# Patient Record
Sex: FEMALE | Race: WHITE
[De-identification: names, ages, dates, MRNs, and addresses within clinical notes are randomized per-mention and may not be internally consistent; named-entity substitution may affect disease eponyms.]

---

## 2017-06-14 ENCOUNTER — HOSPITAL ENCOUNTER (EMERGENCY)
Dept: HOSPITAL 45 - C.EDB | Age: 27
Discharge: HOME | End: 2017-06-14
Payer: COMMERCIAL

## 2017-06-14 VITALS
HEIGHT: 62.01 IN | BODY MASS INDEX: 53.24 KG/M2 | HEIGHT: 62.01 IN | BODY MASS INDEX: 53.24 KG/M2 | WEIGHT: 292.99 LBS | WEIGHT: 292.99 LBS

## 2017-06-14 VITALS — HEART RATE: 73 BPM | OXYGEN SATURATION: 97 % | DIASTOLIC BLOOD PRESSURE: 90 MMHG | SYSTOLIC BLOOD PRESSURE: 149 MMHG

## 2017-06-14 VITALS — TEMPERATURE: 98.06 F

## 2017-06-14 DIAGNOSIS — Z83.3: ICD-10-CM

## 2017-06-14 DIAGNOSIS — Z82.49: ICD-10-CM

## 2017-06-14 DIAGNOSIS — M25.522: Primary | ICD-10-CM

## 2017-06-14 DIAGNOSIS — N83.209: ICD-10-CM

## 2017-06-14 DIAGNOSIS — Z79.899: ICD-10-CM

## 2017-06-14 DIAGNOSIS — K21.9: ICD-10-CM

## 2017-06-14 NOTE — EMERGENCY ROOM VISIT NOTE
History


First contact with patient:  20:49


Chief Complaint:  ELBOW PAIN/INJURY


Stated Complaint:  LT ELBOW/ARM SWOLLEN- CAN BARLEY MOVE IT





History of Present Illness


The patient is a 26 year old female who presents to the Emergency Room via 

private vehicle with complaints of "left elbow/arm swollen can barely move in".

  The patient states that approximately one week ago her left elbow began to 

develop pain.  She denies or is unable to identify any injury to the area.  She 

states that the elbow pain is worse with movement.  She notes particularly 

worse with complete flexion of the left elbow.  She points to the musculature 

just inferior to the left elbow joint on the ventral aspect of the proximal 

forearm as a location of pain.  She also felt that the skin overlying the 

olecranon process was slightly red yesterday.  She denies any history of blood 

clots.  She denies any fevers or chills.  She notes that the joint has not been 

warm to the touch, or diffusely red.





Review of Systems


A complete 6-point Review of Systems was discussed with the patient, with 

pertinent positives and negatives listed in the History of Present Illness. All 

remaining Review of Systems questions can be considered negative unless 

otherwise specified.





Past Medical/Surgical History


Medical Problems:


(1) GERD (gastroesophageal reflux disease)


(2) Ovarian cyst








Family History





Diabetes mellitus


Heart disease





Social History


Smoking Status:  Never Smoker


Alcohol Use:  none


Marital Status:  in relationship


Housing Status:  lives with significant other


Occupation Status:  employed





Current/Historical Medications


Scheduled


Birth Control Pills (Birth Control Pills), 1 TAB PO DAILY


Escitalopram Oxalate (Escitalopram Oxalate), 20 MG PO DAILY


Pantoprazole (Pantoprazole Sodium), 40 MG PO DAILY





Allergies


Coded Allergies:  


     No Known Allergies (Unverified , 1/7/16)





Physical Exam


Vital Signs











  Date Time  Temp Pulse Resp B/P (MAP) Pulse Ox O2 Delivery O2 Flow Rate FiO2


 


6/14/17 22:40  73 20 149/90 97   


 


6/14/17 20:28 36.7 88 18  98 Room Air  











Physical Exam


VITAL SIGNS - Vital signs and nursing notes were reviewed.  Patient is afebrile

, hypertensive, non-tachycardic and is saturating well on room air.


GENERAL -26-year-old female appearing her stated age who is in no acute 

distress. Communicates well with provider and answers questions appropriately.


SKIN - Without rashes.  Skin overlying the left elbow is unremarkable.  There 

is specifically no erythema, edema or rash.


EXTREMITIES - No clubbing or peripheral cyanosis. No pretibial edema present.  

No tenderness to palpation overlying the left shoulder and left humerus.  There 

is no tenderness to palpation overlying the left elbow joint.  With passive 

range of motion there is reproducible tenderness with complete flexion.  No 

palpable bursitis or inflammation overlying the olecranon process.  She is 

neurovascularly intact in this region.  +5/5 strength noted in UE/LE 

bilaterally.





Medical Decision & Procedures


ER Provider


Diagnostic Interpretation:


LEFT ELBOW MIN 3 VIEWS ROUTINE





CLINICAL HISTORY: Left elbow pain, no trauma    





COMPARISON: None.





DISCUSSION: The bones and joint spaces appear intact. There is no evidence of


fracture, dislocation or bony disease. There is no evidence for soft tissue


swelling.





IMPRESSION: Negative study.











Electronically signed by:  Mk Gonsalves M.D.


6/14/2017 9:39 PM





Dictated Date/Time:  6/14/2017 9:39 PM





VENOUS DOPPLER LEFT ARM UPPER EXTREMITY VENOUS DOPPLER





HISTORY: Pain  Left elbow region edema, tenderness. No trauma





COMPARISON STUDY:  None.





FINDINGS: The internal jugular vein is patent. There is normal flow within the


subclavian vein. There is normal flow and compressibility within the left


axillary, basilic, brachial, radial, ulnar, and visualized cephalic veins. 





IMPRESSION:  


No DVT within the upper extremity. 











Electronically signed by:  Mk Gonsalves M.D.


6/14/2017 10:12 PM





Dictated Date/Time:  6/14/2017 10:12 PM





Medical Decision


Patient was seen and evaluated as above.  After obtaining a thorough history 

and physical examination, radiograph and x-ray were obtained of the affected 

regions.  The patient presents with an atraumatic left elbow pain.  I would 

like to state the elbow pain is rather in the musculature just inferior to left 

elbow joint.  There is no evidence of septic joint on examination.  The joint 

is not red, hot, swollen or tender to palpation.  Her vital signs are stable.  

She subjectively reports no fever.  Objectively no fever.  Radiograph is 

negative.  Ultrasound is negative.  I suspect the patient may be expressing 

either pain secondary to overuse as she does a lot of typing, or she may strain 

this region.  She also may have a slight tendinitis.  Regardless, I'll treat 

her conservatively with an arm sling, over-the-counter medication as well as 

follow-up with the on-call orthopedic doctor regarding today's visit by calling 

him tomorrow for which she was instructed to do.  She does seem happy with this 

plan of care.  She was educated upon worrisome symptoms which to return, as 

well as symptoms to watch out for in case she would develop a septic joint.  

She verbalized understanding.  She had questions regarding discharge, and was 

discharged home in good condition.





Evaluation treatment this patient the following differential diagnoses 

entertained: Fracture, Lyme disease, septic joint, bursitis, epicondylitis, 

tendonitis, muscle strain, among others.





Impression





 Primary Impression:  


 Elbow pain, left





Departure Information


Dispostion


Home / Self-Care





Condition


GOOD





Referrals


No Doctor, Assigned (PCP)





Patient Instructions


My [facility]





Additional Instructions





You have been treated in the Emergency Department for Elbow Pain. You have 

received pain medicine in the emergency department which impairs your ability 

to operate a vehicle. It is illegal for you to drive after receiving these 

medicines. 





For pain control, you can use the following over-the-counter medicines


:


- Regular strength (325mg/tab) Tylenol (acetaminophen) 2 tabs every 4-6 hours 

as needed. Do not exceed 12 tablets in a 24 hour period. Avoid taking more than 

3 grams (3000 mg) of Tylenol per day. This includes any other sources of 

acetaminophen you may take on a regular basis.





- Regular strength (200 mg/tab) Advil (ibuprofen) 1-2 tabs every 4-6 hours as 

needed. Do not exceed a dose of 3200 mg per day.





If this is a recent injury (<24 hrs), ice can be applied to the area of pain 

for the first 3 days to help decrease pain and inflammation.





You have been provided the number for an Orthopaedic Surgeon. You should call 

this number as soon as possible to establish a follow-up visit from today's 

Emergency Department visit.





Keep the sling/splint in place until evaluated by Orthopedics.





Return to the Emergency Department if your current symptoms worsen despite 

treatment course outlined above, or if you develop any of the following symptoms

: intractable pain despite aforementioned treatment course or new onset of 

numbness or tingling of the arm.








As we discussed if you develop a fever, red, hot, swollen elbow please return 

immediately.





Please return to the emergency department with any new/concerning symptoms.

## 2017-06-14 NOTE — DIAGNOSTIC IMAGING REPORT
LEFT ELBOW MIN 3 VIEWS ROUTINE



CLINICAL HISTORY: Left elbow pain, no trauma    



COMPARISON: None.



DISCUSSION: The bones and joint spaces appear intact. There is no evidence of

fracture, dislocation or bony disease. There is no evidence for soft tissue

swelling.



IMPRESSION: Negative study.







Electronically signed by:  Mk Gonsalves M.D.

6/14/2017 9:39 PM



Dictated Date/Time:  6/14/2017 9:39 PM

## 2017-06-14 NOTE — DIAGNOSTIC IMAGING REPORT
VENOUS DOPPLER LEFT ARM UPPER EXTREMITY VENOUS DOPPLER



HISTORY: Pain  Left elbow region edema, tenderness. No trauma



COMPARISON STUDY:  None.



FINDINGS: The internal jugular vein is patent. There is normal flow within the

subclavian vein. There is normal flow and compressibility within the left

axillary, basilic, brachial, radial, ulnar, and visualized cephalic veins. 



IMPRESSION:  

No DVT within the upper extremity. 







Electronically signed by:  Mk Gonsalves M.D.

6/14/2017 10:12 PM



Dictated Date/Time:  6/14/2017 10:12 PM

## 2017-11-22 ENCOUNTER — HOSPITAL ENCOUNTER (EMERGENCY)
Dept: HOSPITAL 45 - C.EDB | Age: 27
LOS: 1 days | Discharge: HOME | End: 2017-11-23
Payer: COMMERCIAL

## 2017-11-22 VITALS — TEMPERATURE: 97.88 F

## 2017-11-22 VITALS — SYSTOLIC BLOOD PRESSURE: 148 MMHG | OXYGEN SATURATION: 100 % | DIASTOLIC BLOOD PRESSURE: 78 MMHG | HEART RATE: 84 BPM

## 2017-11-22 VITALS
WEIGHT: 293 LBS | WEIGHT: 293 LBS | HEIGHT: 60.98 IN | BODY MASS INDEX: 55.32 KG/M2 | HEIGHT: 60.98 IN | BODY MASS INDEX: 55.32 KG/M2

## 2017-11-22 DIAGNOSIS — Z82.49: ICD-10-CM

## 2017-11-22 DIAGNOSIS — D72.829: Primary | ICD-10-CM

## 2017-11-22 DIAGNOSIS — R10.9: ICD-10-CM

## 2017-11-22 DIAGNOSIS — Z83.3: ICD-10-CM

## 2017-11-22 LAB
ANION GAP SERPL CALC-SCNC: 5 MMOL/L (ref 3–11)
BASOPHILS # BLD: 0.02 K/UL (ref 0–0.2)
BASOPHILS NFR BLD: 0.1 %
BUN SERPL-MCNC: 12 MG/DL (ref 7–18)
BUN/CREAT SERPL: 11.8 (ref 10–20)
CALCIUM SERPL-MCNC: 9 MG/DL (ref 8.5–10.1)
CHLORIDE SERPL-SCNC: 103 MMOL/L (ref 98–107)
CO2 SERPL-SCNC: 29 MMOL/L (ref 21–32)
COMPLETE: YES
CREAT CL PREDICTED SERPL C-G-VRATE: 111.5 ML/MIN
CREAT SERPL-MCNC: 1.02 MG/DL (ref 0.6–1.2)
EOSINOPHIL NFR BLD AUTO: 347 K/UL (ref 130–400)
GLUCOSE SERPL-MCNC: 90 MG/DL (ref 70–99)
HCT VFR BLD CALC: 36.8 % (ref 37–47)
IG%: 0.3 %
IMM GRANULOCYTES NFR BLD AUTO: 25.6 %
LYMPHOCYTES # BLD: 3.88 K/UL (ref 1.2–3.4)
MCH RBC QN AUTO: 27.8 PG (ref 25–34)
MCHC RBC AUTO-ENTMCNC: 33.2 G/DL (ref 32–36)
MCV RBC AUTO: 83.8 FL (ref 80–100)
MONOCYTES NFR BLD: 5.1 %
NEUTROPHILS # BLD AUTO: 1.3 %
NEUTROPHILS NFR BLD AUTO: 67.6 %
PMV BLD AUTO: 9.5 FL (ref 7.4–10.4)
POTASSIUM SERPL-SCNC: 3.7 MMOL/L (ref 3.5–5.1)
PREG INTERNAL NEGATIVE QC: (no result)
PREG INTERNAL POSITIVE QC: (no result)
RBC # BLD AUTO: 4.39 M/UL (ref 4.2–5.4)
SODIUM SERPL-SCNC: 138 MMOL/L (ref 136–145)
WBC # BLD AUTO: 15.18 K/UL (ref 4.8–10.8)

## 2017-11-22 NOTE — EMERGENCY ROOM VISIT NOTE
History


First contact with patient:  22:21


Chief Complaint:  ABDOMINAL PAIN


Stated Complaint:  LUMP IN RT SIDE OF STOMACH


Nursing Triage Summary:  


C/o painful lump on right abdomen. States she noticed it today.





History of Present Illness


The patient is a 27 year old female who presents to the Emergency Room with 

complaints of possible lump to her right lower quadrant for the past day.  

Patient thought she felt a lump in her abdomen.  This is new for her.  Patient 

denies injury to the area, chest pain, dyspnea, back pain, flank pain, nausea, 

vomiting, diarrhea, urinary symptoms, vaginal itching or discharge.  No 

bruising.  She has had a cyst removed from her ovary in the past.  No known 

hernia.





Review of Systems


See HPI for pertinent positives & negatives. A total of 10 systems reviewed and 

were otherwise negative.





Past Medical/Surgical History


Medical Problems:


(1) GERD (gastroesophageal reflux disease)


(2) Ovarian cyst


Anxiety, cyst removal





Family History





Diabetes mellitus


Heart disease





Social History


Smoking Status:  Never Smoker


Alcohol Use:  none


Marital Status:  in relationship


Housing Status:  lives with significant other


Occupation Status:  employed





Physical Exam


Vital Signs











  Date Time  Temp Pulse Resp B/P (MAP) Pulse Ox O2 Delivery O2 Flow Rate FiO2


 


11/22/17 23:36  84 16 148/78 100 Room Air  


 


11/22/17 22:19 36.6 88 18 155/99 100 Room Air  











Physical Exam


VITALS: Vitals are noted on the nurse's note and reviewed by myself.  Vital 

signs hypertensive


GENERAL: Pleasant female, in no acute distress, nondiaphoretic, well-developed 

well-nourished.


SKIN: Capillary reflex less than 2 seconds.


HEENT: Normocephalic.  PERRLA.  EOMI.  Nares patent.  Mucous membranes moist.  

Neck is supple without nuchal rigidity.


HEART: Regular rate and rhythm without murmurs gallops or rubs.


LUNGS: Clear to auscultation bilaterally without wheezes, rales or rhonchi.  No 

retractions or accessory muscle use.


ABDOMEN: Positive bowel sounds x 4.  Normal tympanic percussion.  Soft, 

protuberant, obese, nontender, without masses or organomegaly. Tovar sign 

negative.  No guarding or rebound tenderness.  No CVA tenderness.  The patient 

showed me the area of her concern and I was unable to palpate a mass.  Patient 

is pointing to her pannus I explained to her this most likely is tissue versus 

a mass.


MUSCULOSKELETAL: No gross musculoskeletal defects.   No calf tenderness.


NEURO: Patient was alert and oriented to person place and time.  Normal 

sensation to light and sharp touch.    No focal neurological deficits.





Medical Decision & Procedures


Laboratory Results


11/22/17 22:40








Red Blood Count 4.39, Mean Corpuscular Volume 83.8, Mean Corpuscular Hemoglobin 

27.8, Mean Corpuscular Hemoglobin Concent 33.2, Mean Platelet Volume 9.5, 

Neutrophils (%) (Auto) 67.6, Lymphocytes (%) (Auto) 25.6, Monocytes (%) (Auto) 

5.1, Eosinophils (%) (Auto) 1.3, Basophils (%) (Auto) 0.1, Neutrophils # (Auto) 

10.27, Lymphocytes # (Auto) 3.88, Monocytes # (Auto) 0.78, Eosinophils # (Auto) 

0.19, Basophils # (Auto) 0.02





11/22/17 22:40

















Test


  11/22/17


22:40


 


White Blood Count


  15.18 K/uL


(4.8-10.8)


 


Red Blood Count


  4.39 M/uL


(4.2-5.4)


 


Hemoglobin


  12.2 g/dL


(12.0-16.0)


 


Hematocrit 36.8 % (37-47) 


 


Mean Corpuscular Volume


  83.8 fL


()


 


Mean Corpuscular Hemoglobin


  27.8 pg


(25-34)


 


Mean Corpuscular Hemoglobin


Concent 33.2 g/dl


(32-36)


 


Platelet Count


  347 K/uL


(130-400)


 


Mean Platelet Volume


  9.5 fL


(7.4-10.4)


 


Neutrophils (%) (Auto) 67.6 % 


 


Lymphocytes (%) (Auto) 25.6 % 


 


Monocytes (%) (Auto) 5.1 % 


 


Eosinophils (%) (Auto) 1.3 % 


 


Basophils (%) (Auto) 0.1 % 


 


Neutrophils # (Auto)


  10.27 K/uL


(1.4-6.5)


 


Lymphocytes # (Auto)


  3.88 K/uL


(1.2-3.4)


 


Monocytes # (Auto)


  0.78 K/uL


(0.11-0.59)


 


Eosinophils # (Auto)


  0.19 K/uL


(0-0.5)


 


Basophils # (Auto)


  0.02 K/uL


(0-0.2)


 


RDW Standard Deviation


  40.9 fL


(36.4-46.3)


 


RDW Coefficient of Variation


  13.5 %


(11.5-14.5)


 


Immature Granulocyte % (Auto) 0.3 % 


 


Immature Granulocyte # (Auto)


  0.04 K/uL


(0.00-0.02)


 


Anion Gap


  5.0 mmol/L


(3-11)


 


Est Creatinine Clear Calc


Drug Dose 111.5 ml/min 


 


 


Estimated GFR (


American) 88.5 


 


 


Estimated GFR (Non-


American 76.4 


 


 


BUN/Creatinine Ratio 11.8 (10-20) 


 


Calcium Level


  9.0 mg/dl


(8.5-10.1)


 


Human Chorionic Gonadotropin,


Qual NEG (NEG) 


 











ED Course


Prior records reviewed and summarized as above.


Triage Nursing notes reviewed.


Additional history obtained from boyfriend.





The patient's history was concerning for possible lump to lower abdomen





Differential diagnosis:


Etiologies such as lipoma, hernia, cellulitis, abscess, tissue, as well as 

others were entertained..





Physical examination:


As above





ER treatment provided:


Patient was observed


On reassessment the patient felt better.





Diagnostics interpreted by me:


The labs revealed stable H&H.,  Leukocytosis.  Per chart review, patient 

chronically has leukocytosis


Negative hCG





Imaging studies:


US SOFT TISSUE:


No soft tissue abnormality identified in the region of palpable abnormality.


Radiologist: Nader Cardenas M.D.








This appears to be patient's tissue without palpable mass or hernia seen on 

ultrasound.  Patient was advised to follow-up with family care for her ongoing 

leukocytosis.  Patient did not have acute abdomen on exam.  She is well-

appearing.  She is tolerating fluids.  She is advised follow-up family care in 

a few days or here in the ER sooner for abdominal pain, fevers, vomiting, 

worsening signs or symptoms or as needed.  Patient is reassured that the mass 

that she is concerned about is her own tissue.  There was no mass seen on 

ultrasound or palpated on exam.





By the evaluation outlined above emergent etiologies such as lipoma, hernia, as 

well as others were deemed relatively unlikely.





The pt informed about the findings as listed above. All questions were answered 

and  pleased with the treatment. Return instructions were outlined and the 

patient was discharged in stable condition.








Referral:


The patient was referred back to  primary care physician for follow-up in 2 to 

3 days for a recheck of the current condition.





Case reviewed with my attending





Medical Decision


As above





Medication Reconcilliation


Current Medication List:  was personally reviewed by me





Blood Pressure Screening


Patient's blood pressure:  Elevated blood pressure


Blood pressure disposition:  Elevated BP felt to be situational





Impression





 Primary Impression:  


 Leukocytosis





Departure Information


Dispostion


Home / Self-Care





Condition


GOOD





Referrals


No Doctor, Assigned (PCP)





Patient Instructions


My [facility]





Additional Instructions





Ibuprofen(Motrin, Advil) may be used for fever or pain.  Use 600mg every six 

hours as needed.  Take with food.  Avoid using more than 2400mg in a 24 hour 

period.  Do not use 2400mg per day for more than three consecutive days without 

physician direction.  Prolonged inappropriate use can lead to stomach upset or 

ulcers. 


(AND/OR)


Acetaminophen(Tylenol) may be used for fever or pain.  Use 1000mg every six 

hours as needed.  Avoid using more than 4000mg in a 24 hour period.  





Rest and drink plenty of fluids as tolerated.





Continue current medications.





Recommend further workup for your chronic leukocytosis with family care.





Return to the ER immediately for   abdominal pain, vomiting, fevers, chest pains

, difficulty breathing, worsening of your condition, or as needed.





Follow up with your primary physician in 2-3 days for a recheck of your current 

condition.





Problem Qualifiers








 Primary Impression:  


 Leukocytosis


 Leukocytosis type:  unspecified  Qualified Codes:  D72.829 - Elevated white 

blood cell count, unspecified

## 2017-11-23 NOTE — DIAGNOSTIC IMAGING REPORT
LIMITED ABDOMINAL ULTRASOUND



CLINICAL HISTORY: Palpable right lower quadrant mass.    



COMPARISON STUDY:  CT scan dated 4/14/2016



FINDINGS: Ultrasonographic evaluation targeted to the area of palpable

abnormality was performed. There are no corresponding masses.



IMPRESSION:  No ultrasonographic masses were visualized to correlate with the

reported palpable finding. Clinical follow-up is advocated 









Electronically signed by:  Nav Booth M.D.

11/23/2017 6:48 AM



Dictated Date/Time:  11/23/2017 6:47 AM

## 2018-08-18 ENCOUNTER — HOSPITAL ENCOUNTER (EMERGENCY)
Dept: HOSPITAL 45 - C.EDB | Age: 28
Discharge: HOME | End: 2018-08-18
Payer: COMMERCIAL

## 2018-08-18 VITALS
HEIGHT: 61.5 IN | WEIGHT: 293 LBS | BODY MASS INDEX: 54.61 KG/M2 | BODY MASS INDEX: 54.61 KG/M2 | HEIGHT: 61.5 IN | WEIGHT: 293 LBS

## 2018-08-18 VITALS — SYSTOLIC BLOOD PRESSURE: 135 MMHG | DIASTOLIC BLOOD PRESSURE: 78 MMHG | OXYGEN SATURATION: 98 % | HEART RATE: 88 BPM

## 2018-08-18 VITALS — TEMPERATURE: 98.24 F

## 2018-08-18 DIAGNOSIS — K21.9: ICD-10-CM

## 2018-08-18 DIAGNOSIS — R42: ICD-10-CM

## 2018-08-18 DIAGNOSIS — Z83.3: ICD-10-CM

## 2018-08-18 DIAGNOSIS — R55: Primary | ICD-10-CM

## 2018-08-18 DIAGNOSIS — Z79.899: ICD-10-CM

## 2018-08-18 DIAGNOSIS — Z79.3: ICD-10-CM

## 2018-08-18 DIAGNOSIS — I10: ICD-10-CM

## 2018-08-18 LAB
ALBUMIN SERPL-MCNC: 2.9 GM/DL (ref 3.4–5)
ALP SERPL-CCNC: 90 U/L (ref 45–117)
ALT SERPL-CCNC: 21 U/L (ref 12–78)
AST SERPL-CCNC: 17 U/L (ref 15–37)
BASOPHILS # BLD: 0.02 K/UL (ref 0–0.2)
BASOPHILS NFR BLD: 0.2 %
BUN SERPL-MCNC: 11 MG/DL (ref 7–18)
CALCIUM SERPL-MCNC: 8.7 MG/DL (ref 8.5–10.1)
CO2 SERPL-SCNC: 23 MMOL/L (ref 21–32)
CREAT SERPL-MCNC: 1.05 MG/DL (ref 0.6–1.2)
EOS ABS #: 0.01 K/UL (ref 0–0.5)
EOSINOPHIL NFR BLD AUTO: 365 K/UL (ref 130–400)
GLUCOSE SERPL-MCNC: 119 MG/DL (ref 70–99)
HCT VFR BLD CALC: 38 % (ref 37–47)
HGB BLD-MCNC: 12.5 G/DL (ref 12–16)
IG#: 0.02 K/UL (ref 0–0.02)
IMM GRANULOCYTES NFR BLD AUTO: 11.1 %
LIPASE: 110 U/L (ref 73–393)
LYMPHOCYTES # BLD: 1.36 K/UL (ref 1.2–3.4)
MCH RBC QN AUTO: 27.6 PG (ref 25–34)
MCHC RBC AUTO-ENTMCNC: 32.9 G/DL (ref 32–36)
MCV RBC AUTO: 83.9 FL (ref 80–100)
MONO ABS #: 0.45 K/UL (ref 0.11–0.59)
MONOCYTES NFR BLD: 3.7 %
NEUT ABS #: 10.43 K/UL (ref 1.4–6.5)
NEUTROPHILS # BLD AUTO: 0.1 %
NEUTROPHILS NFR BLD AUTO: 84.7 %
PMV BLD AUTO: 9.9 FL (ref 7.4–10.4)
POTASSIUM SERPL-SCNC: 3.6 MMOL/L (ref 3.5–5.1)
PROT SERPL-MCNC: 7.5 GM/DL (ref 6.4–8.2)
RED CELL DISTRIBUTION WIDTH CV: 13.7 % (ref 11.5–14.5)
RED CELL DISTRIBUTION WIDTH SD: 42 FL (ref 36.4–46.3)
SODIUM SERPL-SCNC: 138 MMOL/L (ref 136–145)
WBC # BLD AUTO: 12.29 K/UL (ref 4.8–10.8)

## 2018-08-18 NOTE — DIAGNOSTIC IMAGING REPORT
HEAD WITHOUT CONTRAST (CT)



CLINICAL HISTORY: 28 years-old Female presenting with HA, dizziness, passing

out. 



TECHNIQUE: Multidetector CT imaging of the head was performed without the use of

intravenous contrast. IV contrast: None. A dose lowering technique was used

consistent with the principles of ALARA (as low as reasonably achievable). 



COMPARISON: None.



CT DOSE (mGy.cm): The estimated cumulative dose is 537.48 mGy.cm.



FINDINGS: 



 topogram: Unremarkable.



Ventricles and sulci normal in size. Brain parenchyma normal in appearance with

preserved gray-white differentiation. No mass effect or midline shift. No

hemorrhage or acute territorial infarct. No extra-axial fluid collection.

Paranasal sinuses and mastoid air cells clear. Calvarium intact.    



IMPRESSION:

1.  No acute intracranial abnormality. 







Electronically signed by:  Lul Elizabeth M.D.

8/18/2018 7:12 PM



Dictated Date/Time:  8/18/2018 7:10 PM

## 2018-08-18 NOTE — EMERGENCY ROOM VISIT NOTE
History


Report prepared by Merissa:  Dora Tucker


Under the Supervision of:  Dr. Daniel Zee D.O.


First contact with patient:  18:18


Chief Complaint:  DIZZY


Stated Complaint:  DIZZINESS,PUKING,PASS OUT





History of Present Illness


The patient is a 28 year old female who presents to the Emergency Room with 

complaints of intermittent dizziness starting 13.5 hours ago. The patient 

states that she woke up this morning the use the restroom around 0530. She 

states that once she got into the bathroom she felt dizzy like the room was 

spinning and lightheaded. She reports that her ears started ringing and she 

felt nauseous. She states that while urinating she passed out and fell off the 

toilet. She states that she woke up and knew where she was, but noticed a large 

goose egg on her head. Her  notes that she was only in the bathroom for 7

-8 minutes in total so doesn't believe she was passed out for too long. The 

patient states that when she came to she vomited and then felt a little better. 

She states that she went back to bed until 1100. She states that when she awoke 

again she had the same symptoms, but did not pass out. The patient states that 

she has not felt well since then, but notes that her dizziness and nausea has 

gone away. The patient complains of a headache. The patient denies neck pain, 

chest pain, abdominal pain, urinary symptoms, weakness, and numbness. The 

patient notes that her LNMP just ended today.





   Source of History:  patient, spouse/significant other


   Onset:  13.5 hours ago


   Quality:  other (dizziness)


   Timing:  intermittent


   Modifying Factors (Relieving):  other (vomiting)


   Associated Symptoms:  + LOC, + headache, + nausea, + vomiting, No chest pain

, No SOB, No abdominal pain, No urinary symptoms, No weakness, No numbness


Note:


The patient complains of lightheadedness and her ears ringing.





Review of Systems


See HPI for pertinent positives & negatives. A total of 10 systems reviewed and 

were otherwise negative.





Past Medical & Surgical


Medical Problems:


(1) GERD (gastroesophageal reflux disease)


(2) HTN (hypertension)


(3) Ovarian cyst








Family History





Diabetes mellitus


Heart disease





Social History


Smoking Status:  Never Smoker


Alcohol Use:  none


Marital Status:  in relationship


Housing Status:  lives with significant other


Occupation Status:  employed





Current/Historical Medications


Scheduled


Birth Control Pills (Birth Control Pills), 1 TAB PO DAILY


Cholecalciferol (Vitamin D), 2,000 UNITS PO DAILY


Citalopram (Citalopram Hydrobromide), 1 TAB PO DAILY


Lisinopril (Zestril), 1 TAB PO DAILY


Meclizine HCl (Meclizine HCl), 25 MG PO TID


Pantoprazole (Protonix), 20 MG PO DAILY





Allergies


Coded Allergies:  


     No Known Allergies (Unverified , 8/18/18)





Physical Exam


Vital Signs











  Date Time  Temp Pulse Resp B/P (MAP) Pulse Ox O2 Delivery O2 Flow Rate FiO2


 


8/18/18 20:03  88 20 135/78 98   


 


8/18/18 19:48  88  135/78 98 Room Air  


 


8/18/18 18:13 36.8 112 20 154/92 97 Room Air  











Physical Exam


GENERAL: Morbidly obese, sitting up on edge of bed, no acute distress, nontoxic.


EYE EXAM: normal conjunctiva. 


OROPHARYNX: no exudate, no erythema, lips, buccal mucosa, and tongue normal and 

mucous membranes are moist


NECK: supple, no nuchal rigidity, no adenopathy, non-tender


LUNGS: Clear to auscultation. Normal chest wall mechanics


HEART: no murmurs, S1 normal and S2 normal 


ABDOMEN: abdomen soft, non-tender, normo-active bowel sounds, no masses, no 

rebound or guarding. 


BACK: Back is symmetrical on inspection and there is no deformity, no midline 

tenderness, no CVA tenderness. 


SKIN: no rashes and no bruising 


UPPER EXTREMITIES: upper extremities are grossly normal. 


LOWER EXTREMITIES: No pitting edema. 


NEURO EXAM: Normal sensorium, cranial nerves II-XII intact, normal speech,  no 

weakness of arms, no weakness of legs. No drift. Finger to nose intact. Gross 

sensation intact. Ambulates without difficulty.





Medical Decision & Procedures


ER Provider


Diagnostic Interpretation:


Radiology results as stated below per my review and the radiologist's 

interpretation: 





HEAD WITHOUT CONTRAST (CT)





CLINICAL HISTORY: 28 years-old Female presenting with HA, dizziness, passing


out. 





TECHNIQUE: Multidetector CT imaging of the head was performed without the use of


intravenous contrast. IV contrast: None. A dose lowering technique was used


consistent with the principles of ALARA (as low as reasonably achievable). 





COMPARISON: None.





CT DOSE (mGy.cm): The estimated cumulative dose is 537.48 mGy.cm.





FINDINGS: 





 topogram: Unremarkable.





Ventricles and sulci normal in size. Brain parenchyma normal in appearance with


preserved gray-white differentiation. No mass effect or midline shift. No


hemorrhage or acute territorial infarct. No extra-axial fluid collection.


Paranasal sinuses and mastoid air cells clear. Calvarium intact.    





IMPRESSION:


1.  No acute intracranial abnormality. 











Electronically signed by:  Lul Elizabeth M.D.


8/18/2018 7:12 PM





Dictated Date/Time:  8/18/2018 7:10 PM





CHEST ONE VIEW PORTABLE





CLINICAL HISTORY: 28 years-old Female presenting with syncope. 





TECHNIQUE: Portable upright AP view of the chest was obtained.





COMPARISON: 4/14/2016.





FINDINGS:


Cardiomediastinal silhouette normal. No focal opacity. No large effusion or


pneumothorax. Osseous structures normal. Upper abdomen normal.





IMPRESSION:


1.  No acute cardiopulmonary disease.











Electronically signed by:  Lul Elizabeth M.D.


8/18/2018 7:01 PM





Dictated Date/Time:  8/18/2018 7:01 PM





Laboratory Results


8/18/18 18:45








Red Blood Count 4.53, Mean Corpuscular Volume 83.9, Mean Corpuscular Hemoglobin 

27.6, Mean Corpuscular Hemoglobin Concent 32.9, Mean Platelet Volume 9.9, 

Neutrophils (%) (Auto) 84.7, Lymphocytes (%) (Auto) 11.1, Monocytes (%) (Auto) 

3.7, Eosinophils (%) (Auto) 0.1, Basophils (%) (Auto) 0.2, Neutrophils # (Auto) 

10.43, Lymphocytes # (Auto) 1.36, Monocytes # (Auto) 0.45, Eosinophils # (Auto) 

0.01, Basophils # (Auto) 0.02





8/18/18 18:45

















Test


  8/18/18


18:45


 


White Blood Count


  12.29 K/uL


(4.8-10.8)


 


Red Blood Count


  4.53 M/uL


(4.2-5.4)


 


Hemoglobin


  12.5 g/dL


(12.0-16.0)


 


Hematocrit 38.0 % (37-47) 


 


Mean Corpuscular Volume


  83.9 fL


()


 


Mean Corpuscular Hemoglobin


  27.6 pg


(25-34)


 


Mean Corpuscular Hemoglobin


Concent 32.9 g/dl


(32-36)


 


Platelet Count


  365 K/uL


(130-400)


 


Mean Platelet Volume


  9.9 fL


(7.4-10.4)


 


Neutrophils (%) (Auto) 84.7 % 


 


Lymphocytes (%) (Auto) 11.1 % 


 


Monocytes (%) (Auto) 3.7 % 


 


Eosinophils (%) (Auto) 0.1 % 


 


Basophils (%) (Auto) 0.2 % 


 


Neutrophils # (Auto)


  10.43 K/uL


(1.4-6.5)


 


Lymphocytes # (Auto)


  1.36 K/uL


(1.2-3.4)


 


Monocytes # (Auto)


  0.45 K/uL


(0.11-0.59)


 


Eosinophils # (Auto)


  0.01 K/uL


(0-0.5)


 


Basophils # (Auto)


  0.02 K/uL


(0-0.2)


 


RDW Standard Deviation


  42.0 fL


(36.4-46.3)


 


RDW Coefficient of Variation


  13.7 %


(11.5-14.5)


 


Immature Granulocyte % (Auto) 0.2 % 


 


Immature Granulocyte # (Auto)


  0.02 K/uL


(0.00-0.02)


 


Urine Color YELLOW 


 


Urine Appearance CLOUDY (CLEAR) 


 


Urine pH 5.5 (4.5-7.5) 


 


Urine Specific Gravity


  1.024


(1.000-1.030)


 


Urine Protein TRACE (NEG) 


 


Urine Glucose (UA) NEG (NEG) 


 


Urine Ketones NEG (NEG) 


 


Urine Occult Blood 3+ (NEG) 


 


Urine Nitrite NEG (NEG) 


 


Urine Bilirubin NEG (NEG) 


 


Urine Urobilinogen NEG (NEG) 


 


Urine Leukocyte Esterase SMALL (NEG) 


 


Urine WBC (Auto)


  10-30 /hpf


(0-5)


 


Urine RBC (Auto) >30 /hpf (0-4) 


 


Urine Hyaline Casts (Auto) 1-5 /lpf (0-5) 


 


Urine Epithelial Cells (Auto) >30 /lpf (0-5) 


 


Urine Bacteria (Auto) NEG (NEG) 


 


Urine Pregnancy Test NEG (NEG) 


 


Anion Gap


  10.0 mmol/L


(3-11)


 


Est Creatinine Clear Calc


Drug Dose 106.5 ml/min 


 


 


Estimated GFR (


American) 84.9 


 


 


Estimated GFR (Non-


American 73.2 


 


 


BUN/Creatinine Ratio 10.2 (10-20) 


 


Calcium Level


  8.7 mg/dl


(8.5-10.1)


 


Total Bilirubin


  0.4 mg/dl


(0.2-1)


 


Direct Bilirubin


  0.1 mg/dl


(0-0.2)


 


Aspartate Amino Transf


(AST/SGOT) 17 U/L (15-37) 


 


 


Alanine Aminotransferase


(ALT/SGPT) 21 U/L (12-78) 


 


 


Alkaline Phosphatase


  90 U/L


()


 


Troponin I


  < 0.015 ng/ml


(0-0.045)


 


Total Protein


  7.5 gm/dl


(6.4-8.2)


 


Albumin


  2.9 gm/dl


(3.4-5.0)


 


Lipase


  110 U/L


()





Laboratory results per my review.





Medications Administered











 Medications


  (Trade)  Dose


 Ordered  Sig/Yessenia


 Route  Start Time


 Stop Time Status Last Admin


Dose Admin


 


 Sodium Chloride  1,000 ml @ 


 999 mls/hr  Q1H1M STAT


 IV  8/18/18 18:27


 8/18/18 19:27 DC 8/18/18 18:27


999 MLS/HR


 


 Ondansetron HCl


  (Zofran Inj)  4 mg  NOW  STAT


 IV  8/18/18 18:27


 8/18/18 18:30 DC 8/18/18 18:27


4 MG


 


 Meclizine HCl


  (Antivert Tab)  25 mg  NOW  STAT


 PO  8/18/18 18:27


 8/18/18 18:30 DC 8/18/18 18:27


25 MG











ECG Per My Interpretation


Indication:  syncope


Rate (beats per minute):  108


Rhythm:  sinus tachycardia


Findings:  other (normal axis, no PVCs)





ED Course


ED COURSE: 


Vital signs were reviewed and showed tachycardia.


The patients medical record was reviewed


The above diagnostic studies were performed and reviewed.


ED treatments and interventions as stated above. 





1821: The patient was evaluated in room A3. A complete history and physical 

examination was performed.





1827: Ordered Antivert Tab 25 mg PO, Zofran Inj 4 mg IV, NSS 1000 ml @ 999 mls/

hr IV. 





1946: Upon reevaluation, the patient is feeling much better. I discussed my 

findings with the patient and she understands and agrees with the treatment 

plan.   


Based on the patients age, coexisting illnesses, exam and lab findings the 

decision to treat as an outpatient was made.


The patient remained stable while under my care.


The patient appeared well at the time of discharge.





Medical Decision


Differential diagnosis includes etiologies such as benign positional vertigo, 

dehydration, hypovolemia, anemia, tumor, infection, hypoglycemia, electrolyte 

abnormalities, cardiac sources, intracerebral event, toxicologic, neurologic, 

as well as others were entertained.





Patient is a 28-year-old female who presents the ER for dizziness and syncope.  

Patient notes that she was dizzy this morning went to the bathroom and became 

very nauseous and passed out.  She has mild headache.  She notes that she was 

also having significant ringing in her ears.  Throughout the day she has had 

some intermittent dizziness but it has resolved at this point.  She notes that 

she just does not feel good at this time.  She is completely neurologically 

intact.  Able to ambulate without difficulty.  CT head was negative.  CBC shows 

a mild leukocytosis of 12,000 which I favor secondary to the vomiting.  BMP 

along with LFTs, bilirubin lipase was negative.  Troponin was negative with a 

syncopal episode greater than 8 hours ago.  UA was contaminated.  Pregnancy was 

negative.  Patient was given fluids, Zofran and Antivert.  She did feel 

significant better.  Heart rate trended down.  No cardiac risk factors with the 

exception of high blood pressure.  She had no chest pain or shortness of breath 

to suggest PE.  She is discharged follow-up with PCP as an outpatient.





Medication Reconcilliation


Current Medication List:  was personally reviewed by me





Blood Pressure Screening


Patient's blood pressure:  Elevated blood pressure


Blood pressure disposition:  Elevated BP felt to be situational





Impression





 Primary Impression:  


 Dizziness


 Additional Impression:  


 Vasovagal syncope





Scribe Attestation


The scribe's documentation has been prepared under my direction and personally 

reviewed by me in its entirety. I confirm that the note above accurately 

reflects all work, treatment, procedures, and medical decision making performed 

by me.





Departure Information


Dispostion


Home / Self-Care





Prescriptions





Meclizine HCl (Meclizine HCl) 25 Mg Tab


25 MG PO TID for 5 Days


   Prov: Daniel Zee, DO         8/18/18





Referrals


No Doctor, Assigned (PCP)





Forms


HOME CARE DOCUMENTATION FORM,                                                 

               IMPORTANT VISIT INFORMATION





Patient Instructions


My [facility]





Additional Instructions





Please follow up with your primary care doctor with in the next 24 hours.  Any 

worsening of your symptoms, please return to the ED immediately. This includes 

any fevers greater than 100.4, worsening pain, chest pain, shortness breath, 

persistent nausea, vomiting, unable to eat or drink, or any other concerning 

signs or symptoms from your standpoint.





Please do not drive for the remainder the day or while symptomatic.





Please take Antivert as needed for dizziness.





Problem Qualifiers

## 2018-08-18 NOTE — DIAGNOSTIC IMAGING REPORT
CHEST ONE VIEW PORTABLE



CLINICAL HISTORY: 28 years-old Female presenting with syncope. 



TECHNIQUE: Portable upright AP view of the chest was obtained.



COMPARISON: 4/14/2016.



FINDINGS:

Cardiomediastinal silhouette normal. No focal opacity. No large effusion or

pneumothorax. Osseous structures normal. Upper abdomen normal.



IMPRESSION:

1.  No acute cardiopulmonary disease.







Electronically signed by:  Lul Elizabeth M.D.

8/18/2018 7:01 PM



Dictated Date/Time:  8/18/2018 7:01 PM